# Patient Record
Sex: MALE | Employment: FULL TIME | URBAN - METROPOLITAN AREA
[De-identification: names, ages, dates, MRNs, and addresses within clinical notes are randomized per-mention and may not be internally consistent; named-entity substitution may affect disease eponyms.]

---

## 2023-10-04 ENCOUNTER — OFFICE VISIT (OUTPATIENT)
Dept: URGENT CARE | Facility: URGENT CARE | Age: 43
End: 2023-10-04
Payer: COMMERCIAL

## 2023-10-04 VITALS
WEIGHT: 220 LBS | DIASTOLIC BLOOD PRESSURE: 82 MMHG | RESPIRATION RATE: 16 BRPM | TEMPERATURE: 98.6 F | OXYGEN SATURATION: 100 % | SYSTOLIC BLOOD PRESSURE: 128 MMHG | HEART RATE: 71 BPM

## 2023-10-04 DIAGNOSIS — K52.9 GASTROENTERITIS: Primary | ICD-10-CM

## 2023-10-04 PROCEDURE — 99203 OFFICE O/P NEW LOW 30 MIN: CPT | Performed by: PHYSICIAN ASSISTANT

## 2023-10-04 NOTE — LETTER
October 4, 2023      Stephen Mesa  [address] [facility] [address]        To Whom It May Concern:    Stephen DONNELL Mesa  was seen on 10/4.  Please excuse absences this week due to illness.        Sincerely,        Heri Manjarrez PA-C

## 2023-10-15 NOTE — PROGRESS NOTES
SUBJECTIVE  HPI:  Stephen Mesa is a 43 year old male who presents with the CC of Fatigue, diarrhea, muscle soreness, dry mouth, had covid last month and was treated with paxlovid. 3-4 stools last 24 hours.  No vomiting. No persistent abdominal pain.  No blood in stool.     No past medical history on file.  Current Outpatient Medications   Medication Sig Dispense Refill    Dolutegravir-lamiVUDine  MG TABS Take 1 tablet by mouth daily       Social History     Tobacco Use    Smoking status: Never    Smokeless tobacco: Never   Substance Use Topics    Alcohol use: Not on file       ROS:  Review of systems negative except as stated above.    OBJECTIVE:  /82   Pulse 71   Temp 98.6  F (37  C)   Resp 16   Wt 99.8 kg (220 lb)   SpO2 100%   GENERAL APPEARANCE: healthy, alert and no distress  EYES: conjunctiva clear  HENT: ear canals and TM's normal.  Nose and mouth without ulcers, erythema or lesions  NECK: supple, nontender, no lymphadenopathy  RESP: lungs clear to auscultation - no rales, rhonchi or wheezes  CV: regular rates and rhythm, normal S1 S2, no murmur noted  ABDOMEN:  soft, nontender, no HSM or masses and bowel sounds normal  NEURO: Normal strength and tone, sensory exam grossly normal,  normal speech and mentation  SKIN: no suspicious lesions or rashes      No results found for any visits on 10/04/23.    ASSESSMENT:  (K52.9) Gastroenteritis  (primary encounter diagnosis)  Plan: Enteric Bacteria and Virus Panel by ANJUM Stool        Red flags and emergent follow up discussed, and understood by patient  Follow up with PCP if symptoms worsen or fail to improve